# Patient Record
Sex: FEMALE | Race: WHITE | NOT HISPANIC OR LATINO | URBAN - NONMETROPOLITAN AREA
[De-identification: names, ages, dates, MRNs, and addresses within clinical notes are randomized per-mention and may not be internally consistent; named-entity substitution may affect disease eponyms.]

---

## 2021-07-15 ENCOUNTER — WEB ENCOUNTER (OUTPATIENT)
Dept: URBAN - NONMETROPOLITAN AREA CLINIC 2 | Facility: CLINIC | Age: 36
End: 2021-07-15

## 2021-07-15 ENCOUNTER — OFFICE VISIT (OUTPATIENT)
Dept: URBAN - NONMETROPOLITAN AREA CLINIC 2 | Facility: CLINIC | Age: 36
End: 2021-07-15
Payer: COMMERCIAL

## 2021-07-15 DIAGNOSIS — R10.30 LOWER ABDOMINAL PAIN: ICD-10-CM

## 2021-07-15 DIAGNOSIS — R14.0 BLOATING: ICD-10-CM

## 2021-07-15 DIAGNOSIS — R19.4 CHANGE IN BOWEL HABIT: ICD-10-CM

## 2021-07-15 PROCEDURE — 99244 OFF/OP CNSLTJ NEW/EST MOD 40: CPT | Performed by: INTERNAL MEDICINE

## 2021-07-15 RX ORDER — SERTRALINE HYDROCHLORIDE 50 MG/1
TAKE 1 TABLET BY MOUTH ONCE DAILY FOR 30 DAYS TABLET ORAL
Qty: 30 | Refills: 0 | Status: ACTIVE | COMMUNITY

## 2021-07-15 RX ORDER — PREDNISONE 20 MG/1
TAKE 1 TABLET BY MOUTH TWICE DAILY WITH MEALS FOR 5 DAYS TABLET ORAL
Qty: 10 | Refills: 0 | Status: ACTIVE | COMMUNITY

## 2021-07-15 RX ORDER — ONDANSETRON 8 MG/1
DISSOLVE 1 TABLET IN MOUTH EVERY 8 HOURS AS NEEDED TABLET, ORALLY DISINTEGRATING ORAL
Qty: 10 | Refills: 1 | Status: ACTIVE | COMMUNITY

## 2021-07-15 RX ORDER — SULFAMETHOXAZOLE AND TRIMETHOPRIM 800; 160 MG/1; MG/1
TABLET ORAL
Qty: 14 | Status: ACTIVE | COMMUNITY

## 2021-07-15 RX ORDER — SODIUM PICOSULFATE, MAGNESIUM OXIDE, AND ANHYDROUS CITRIC ACID 10; 3.5; 12 MG/160ML; G/160ML; G/160ML
160 ML LIQUID ORAL
Qty: 320 MILLILITER | Refills: 0 | OUTPATIENT
Start: 2021-07-15 | End: 2021-07-16

## 2021-07-15 RX ORDER — FLUTICASONE PROPIONATE 50 UG/1
USE 1 SPRAY(S) IN EACH NOSTRIL ONCE DAILY SPRAY, METERED NASAL
Qty: 16 | Refills: 2 | Status: ACTIVE | COMMUNITY

## 2021-07-15 RX ORDER — KETOCONAZOLE 20 MG/ML
APPLY TO AFFECTED AREA(S) LATHER LEAVE IN PLACE FOR 5 MINUTES AND THEN RINSE OFF WITH WATER TWICE A WEEK SHAMPOO, SUSPENSION TOPICAL
Qty: 120 | Refills: 1 | Status: ACTIVE | COMMUNITY

## 2021-07-15 RX ORDER — HYDROXYZINE PAMOATE 50 MG/1
CAPSULE ORAL
Qty: 30 | Status: ACTIVE | COMMUNITY

## 2021-07-15 RX ORDER — GABAPENTIN 400 MG/1
CAPSULE ORAL
Qty: 180 | Status: ACTIVE | COMMUNITY

## 2021-07-15 RX ORDER — LORATADINE 10 MG/1
TABLET ORAL
Qty: 30 | Status: ACTIVE | COMMUNITY

## 2021-07-15 RX ORDER — TRIAMCINOLONE ACETONIDE 1 MG/G
APPLY A THIN LAYER OF CREAM EXTERNALLY TO AFFECTED AREA TWICE DAILY CREAM TOPICAL
Qty: 80 | Refills: 1 | Status: ACTIVE | COMMUNITY

## 2021-07-15 RX ORDER — VALACYCLOVIR HYDROCHLORIDE 500 MG/1
TAKE 1 TABLET BY MOUTH ONCE DAILY AS NEEDED TABLET, FILM COATED ORAL
Qty: 30 | Refills: 0 | Status: ACTIVE | COMMUNITY

## 2021-08-11 ENCOUNTER — TELEPHONE ENCOUNTER (OUTPATIENT)
Dept: URBAN - NONMETROPOLITAN AREA CLINIC 2 | Facility: CLINIC | Age: 36
End: 2021-08-11

## 2021-08-11 ENCOUNTER — ERX REFILL RESPONSE (OUTPATIENT)
Dept: URBAN - NONMETROPOLITAN AREA CLINIC 2 | Facility: CLINIC | Age: 36
End: 2021-08-11

## 2021-08-11 RX ORDER — POLYETHYLENE GLYCOL 3350, SODIUM SULFATE ANHYDROUS, SODIUM BICARBONATE, SODIUM CHLORIDE, POTASSIUM CHLORIDE 236; 22.74; 6.74; 5.86; 2.97 G/4L; G/4L; G/4L; G/4L; G/4L
AS DIRECTED POWDER, FOR SOLUTION ORAL ONCE
Qty: 1 GALLON | Refills: 0 | OUTPATIENT

## 2021-08-12 ENCOUNTER — TELEPHONE ENCOUNTER (OUTPATIENT)
Dept: URBAN - METROPOLITAN AREA CLINIC 23 | Facility: CLINIC | Age: 36
End: 2021-08-12

## 2021-08-13 ENCOUNTER — OFFICE VISIT (OUTPATIENT)
Dept: URBAN - NONMETROPOLITAN AREA SURGERY CENTER 1 | Facility: SURGERY CENTER | Age: 36
End: 2021-08-13
Payer: COMMERCIAL

## 2021-08-13 ENCOUNTER — TELEPHONE ENCOUNTER (OUTPATIENT)
Dept: URBAN - METROPOLITAN AREA CLINIC 92 | Facility: CLINIC | Age: 36
End: 2021-08-13

## 2021-08-13 DIAGNOSIS — R10.30 LOWER ABDOMINAL PAIN: ICD-10-CM

## 2021-08-13 DIAGNOSIS — D12.4 ADENOMA OF DESCENDING COLON: ICD-10-CM

## 2021-08-13 DIAGNOSIS — K21.00 ALKALINE REFLUX ESOPHAGITIS: ICD-10-CM

## 2021-08-13 DIAGNOSIS — R10.13 ABDOMINAL DISCOMFORT, EPIGASTRIC: ICD-10-CM

## 2021-08-13 DIAGNOSIS — K29.30 CHRONIC SUPERFICIAL GASTRITIS: ICD-10-CM

## 2021-08-13 PROBLEM — 196731005: Status: ACTIVE | Noted: 2021-08-13

## 2021-08-13 PROCEDURE — 43239 EGD BIOPSY SINGLE/MULTIPLE: CPT | Performed by: INTERNAL MEDICINE

## 2021-08-13 PROCEDURE — G8907 PT DOC NO EVENTS ON DISCHARG: HCPCS | Performed by: INTERNAL MEDICINE

## 2021-08-13 PROCEDURE — 45385 COLONOSCOPY W/LESION REMOVAL: CPT | Performed by: INTERNAL MEDICINE

## 2021-08-13 RX ORDER — PREDNISONE 20 MG/1
TAKE 1 TABLET BY MOUTH TWICE DAILY WITH MEALS FOR 5 DAYS TABLET ORAL
Qty: 10 | Refills: 0 | Status: ACTIVE | COMMUNITY

## 2021-08-13 RX ORDER — KETOCONAZOLE 20 MG/ML
APPLY TO AFFECTED AREA(S) LATHER LEAVE IN PLACE FOR 5 MINUTES AND THEN RINSE OFF WITH WATER TWICE A WEEK SHAMPOO, SUSPENSION TOPICAL
Qty: 120 | Refills: 1 | Status: ACTIVE | COMMUNITY

## 2021-08-13 RX ORDER — SULFAMETHOXAZOLE AND TRIMETHOPRIM 800; 160 MG/1; MG/1
TABLET ORAL
Qty: 14 | Status: ACTIVE | COMMUNITY

## 2021-08-13 RX ORDER — TRIAMCINOLONE ACETONIDE 1 MG/G
APPLY A THIN LAYER OF CREAM EXTERNALLY TO AFFECTED AREA TWICE DAILY CREAM TOPICAL
Qty: 80 | Refills: 1 | Status: ACTIVE | COMMUNITY

## 2021-08-13 RX ORDER — VALACYCLOVIR HYDROCHLORIDE 500 MG/1
TAKE 1 TABLET BY MOUTH ONCE DAILY AS NEEDED TABLET, FILM COATED ORAL
Qty: 30 | Refills: 0 | Status: ACTIVE | COMMUNITY

## 2021-08-13 RX ORDER — ONDANSETRON 8 MG/1
DISSOLVE 1 TABLET IN MOUTH EVERY 8 HOURS AS NEEDED TABLET, ORALLY DISINTEGRATING ORAL
Qty: 10 | Refills: 1 | Status: ACTIVE | COMMUNITY

## 2021-08-13 RX ORDER — FLUTICASONE PROPIONATE 50 UG/1
USE 1 SPRAY(S) IN EACH NOSTRIL ONCE DAILY SPRAY, METERED NASAL
Qty: 16 | Refills: 2 | Status: ACTIVE | COMMUNITY

## 2021-08-13 RX ORDER — LORATADINE 10 MG/1
TABLET ORAL
Qty: 30 | Status: ACTIVE | COMMUNITY

## 2021-08-13 RX ORDER — HYDROXYZINE PAMOATE 50 MG/1
CAPSULE ORAL
Qty: 30 | Status: ACTIVE | COMMUNITY

## 2021-08-13 RX ORDER — SERTRALINE HYDROCHLORIDE 50 MG/1
TAKE 1 TABLET BY MOUTH ONCE DAILY FOR 30 DAYS TABLET ORAL
Qty: 30 | Refills: 0 | Status: ACTIVE | COMMUNITY

## 2021-08-13 RX ORDER — GABAPENTIN 400 MG/1
CAPSULE ORAL
Qty: 180 | Status: ACTIVE | COMMUNITY

## 2021-08-13 RX ORDER — POLYETHYLENE GLYCOL 3350, SODIUM SULFATE ANHYDROUS, SODIUM BICARBONATE, SODIUM CHLORIDE, POTASSIUM CHLORIDE 236; 22.74; 6.74; 5.86; 2.97 G/4L; G/4L; G/4L; G/4L; G/4L
AS DIRECTED POWDER, FOR SOLUTION ORAL ONCE
Qty: 1 GALLON | Refills: 0 | Status: ACTIVE | COMMUNITY

## 2021-08-13 RX ORDER — OMEPRAZOLE 40 MG/1
1 CAPSULE 30 MINUTES BEFORE MORNING MEAL CAPSULE, DELAYED RELEASE ORAL
Qty: 180 | Refills: 3 | OUTPATIENT
Start: 2021-08-13

## 2021-08-13 NOTE — HPI-TODAY'S VISIT:
Start omeprazole 40 mg twice daily to treat gastritis and esophagitis.  Schedule CT A/P with contrast to evaluate flank pain and lower abdominal pain.

## 2021-08-14 LAB
A/G RATIO: 2.2
ALBUMIN: 4.9
ALKALINE PHOSPHATASE: 61
ALT (SGPT): 9
AST (SGOT): 12
BASO (ABSOLUTE): 0
BASOS: 0
BILIRUBIN, TOTAL: 0.3
BUN/CREATININE RATIO: 14
BUN: 11
CALCIUM: 9.5
CARBON DIOXIDE, TOTAL: 23
CHLORIDE: 101
CREATININE: 0.77
EGFR IF AFRICN AM: 114
EGFR IF NONAFRICN AM: 99
EOS (ABSOLUTE): 0
EOS: 0
GLOBULIN, TOTAL: 2.2
GLUCOSE: 87
HEMATOCRIT: 45.6
HEMATOLOGY COMMENTS:: (no result)
HEMOGLOBIN: 15
IMMATURE CELLS: (no result)
IMMATURE GRANS (ABS): 0.1
IMMATURE GRANULOCYTES: 1
LYMPHS (ABSOLUTE): 1.9
LYMPHS: 21
MCH: 32.6
MCHC: 32.9
MCV: 99
MONOCYTES(ABSOLUTE): 0.4
MONOCYTES: 4
NEUTROPHILS (ABSOLUTE): 6.9
NEUTROPHILS: 74
NRBC: (no result)
PLATELETS: 440
POTASSIUM: 3.7
PROTEIN, TOTAL: 7.1
RBC: 4.6
RDW: 12.4
SODIUM: 140
WBC: 9.4

## 2021-08-16 ENCOUNTER — WEB ENCOUNTER (OUTPATIENT)
Dept: URBAN - METROPOLITAN AREA CLINIC 92 | Facility: CLINIC | Age: 36
End: 2021-08-16

## 2021-08-25 ENCOUNTER — TELEPHONE ENCOUNTER (OUTPATIENT)
Dept: URBAN - NONMETROPOLITAN AREA CLINIC 2 | Facility: CLINIC | Age: 36
End: 2021-08-25

## 2021-09-14 ENCOUNTER — DASHBOARD ENCOUNTERS (OUTPATIENT)
Age: 36
End: 2021-09-14

## 2021-09-14 ENCOUNTER — OFFICE VISIT (OUTPATIENT)
Dept: URBAN - NONMETROPOLITAN AREA CLINIC 13 | Facility: CLINIC | Age: 36
End: 2021-09-14
Payer: COMMERCIAL

## 2021-09-14 DIAGNOSIS — K63.5 COLON POLYPS: ICD-10-CM

## 2021-09-14 DIAGNOSIS — K29.60 ADENOPAPILLOMATOSIS GASTRICA: ICD-10-CM

## 2021-09-14 DIAGNOSIS — R14.0 BLOATING: ICD-10-CM

## 2021-09-14 PROBLEM — 4556007: Status: ACTIVE | Noted: 2021-09-14

## 2021-09-14 PROCEDURE — 99213 OFFICE O/P EST LOW 20 MIN: CPT | Performed by: NURSE PRACTITIONER

## 2021-09-14 RX ORDER — GABAPENTIN 400 MG/1
CAPSULE ORAL
Qty: 180 | Status: ACTIVE | COMMUNITY

## 2021-09-14 RX ORDER — KETOCONAZOLE 20 MG/ML
APPLY TO AFFECTED AREA(S) LATHER LEAVE IN PLACE FOR 5 MINUTES AND THEN RINSE OFF WITH WATER TWICE A WEEK SHAMPOO, SUSPENSION TOPICAL
Qty: 120 | Refills: 1 | Status: ACTIVE | COMMUNITY

## 2021-09-14 RX ORDER — POLYETHYLENE GLYCOL 3350, SODIUM SULFATE ANHYDROUS, SODIUM BICARBONATE, SODIUM CHLORIDE, POTASSIUM CHLORIDE 236; 22.74; 6.74; 5.86; 2.97 G/4L; G/4L; G/4L; G/4L; G/4L
AS DIRECTED POWDER, FOR SOLUTION ORAL ONCE
Qty: 1 GALLON | Refills: 0 | Status: ACTIVE | COMMUNITY

## 2021-09-14 RX ORDER — FLUTICASONE PROPIONATE 50 UG/1
USE 1 SPRAY(S) IN EACH NOSTRIL ONCE DAILY SPRAY, METERED NASAL
Qty: 16 | Refills: 2 | Status: ACTIVE | COMMUNITY

## 2021-09-14 RX ORDER — OMEPRAZOLE 40 MG/1
1 CAPSULE 30 MINUTES BEFORE MORNING MEAL CAPSULE, DELAYED RELEASE ORAL
Qty: 180 | Refills: 3 | Status: ACTIVE | COMMUNITY
Start: 2021-08-13

## 2021-09-14 RX ORDER — VALACYCLOVIR HYDROCHLORIDE 500 MG/1
TAKE 1 TABLET BY MOUTH ONCE DAILY AS NEEDED TABLET, FILM COATED ORAL
Qty: 30 | Refills: 0 | Status: ACTIVE | COMMUNITY

## 2021-09-14 RX ORDER — LORATADINE 10 MG/1
TABLET ORAL
Qty: 30 | Status: ACTIVE | COMMUNITY

## 2021-09-14 RX ORDER — TRIAMCINOLONE ACETONIDE 1 MG/G
APPLY A THIN LAYER OF CREAM EXTERNALLY TO AFFECTED AREA TWICE DAILY CREAM TOPICAL
Qty: 80 | Refills: 1 | Status: ACTIVE | COMMUNITY

## 2021-09-14 RX ORDER — SERTRALINE HYDROCHLORIDE 50 MG/1
TAKE 1 TABLET BY MOUTH ONCE DAILY FOR 30 DAYS TABLET ORAL
Qty: 30 | Refills: 0 | Status: ACTIVE | COMMUNITY

## 2021-09-14 RX ORDER — ONDANSETRON 8 MG/1
DISSOLVE 1 TABLET IN MOUTH EVERY 8 HOURS AS NEEDED TABLET, ORALLY DISINTEGRATING ORAL
Qty: 10 | Refills: 1 | Status: ACTIVE | COMMUNITY

## 2021-09-14 RX ORDER — SULFAMETHOXAZOLE AND TRIMETHOPRIM 800; 160 MG/1; MG/1
TABLET ORAL
Qty: 14 | Status: ACTIVE | COMMUNITY

## 2021-09-14 RX ORDER — METRONIDAZOLE 250 MG/1
1 TABLET TABLET, FILM COATED ORAL
Qty: 56 TABLET | OUTPATIENT
Start: 2021-09-14 | End: 2021-09-28

## 2021-09-14 RX ORDER — HYDROXYZINE PAMOATE 50 MG/1
CAPSULE ORAL
Qty: 30 | Status: ACTIVE | COMMUNITY

## 2021-09-14 RX ORDER — PREDNISONE 20 MG/1
TAKE 1 TABLET BY MOUTH TWICE DAILY WITH MEALS FOR 5 DAYS TABLET ORAL
Qty: 10 | Refills: 0 | Status: ACTIVE | COMMUNITY

## 2021-09-14 NOTE — HPI-TODAY'S VISIT:
Ms. Martins is a 35-year-old female who been asked to consult on by Dr. Humberto Obrien for bloating.  A copy of this note along with recommendations to be sent to their office.  She states for the last year or so, she has had some bloating.  Symptoms are typically worse postprandial.  Sometimes she does have some loss of appetite whereas other days she feels constantly hungry.  She states she used to have a lot of constipation as well.  She was recently started on Zoloft and reports her bowels have been regular since that time.  Her mother has a history of pancreatitis so she was very worried about this.  She states she just has a chronic generalized abdominal ache that more so radiates from her lower back to her abdomen.  She is fairly bloated on exam.  She has never had a GI work-up for her complaints.  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  She states she is concerned she may have a hemorrhoid as well and would like this evaluated.   8/21 EGD with gastritis/esophagitis 8/21 Colon skin tags, 1 TA polyp 8/21 CT R ovarian cyst, nonobstructing kidney stones   9/14/21 Today Ms Abraham reports for the most part she is doing well. due to insurance issues, she was unable to get PPI until this week. bowels are regular. she still has some bloating. pain is resolved. bloating can be upon awakening. She doesn't have bloating with solid foods, but she will with liquids. she will not be bloated for several days followed by a day of bloating. SB

## 2022-05-12 NOTE — HPI-TODAY'S VISIT:
Ms. Martins is a 35-year-old female who been asked to consult on by Dr. Humberto Obrien for bloating.  A copy of this note along with recommendations to be sent to their office.  She states for the last year or so, she has had some bloating.  Symptoms are typically worse postprandial.  Sometimes she does have some loss of appetite whereas other days she feels constantly hungry.  She states she used to have a lot of constipation as well.  She was recently started on Zoloft and reports her bowels have been regular since that time.  Her mother has a history of pancreatitis so she was very worried about this.  She states she just has a chronic generalized abdominal ache that more so radiates from her lower back to her abdomen.  She is fairly bloated on exam.  She has never had a GI work-up for her complaints.  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  She states she is concerned she may have a hemorrhoid as well and would like this evaluated. Sb  No